# Patient Record
Sex: FEMALE | Race: WHITE
[De-identification: names, ages, dates, MRNs, and addresses within clinical notes are randomized per-mention and may not be internally consistent; named-entity substitution may affect disease eponyms.]

---

## 2020-12-10 ENCOUNTER — HOSPITAL ENCOUNTER (OUTPATIENT)
Dept: HOSPITAL 92 - BICMAMMO | Age: 55
Discharge: HOME | End: 2020-12-10
Attending: OBSTETRICS & GYNECOLOGY
Payer: COMMERCIAL

## 2020-12-10 DIAGNOSIS — R92.1: ICD-10-CM

## 2020-12-10 DIAGNOSIS — N63.10: ICD-10-CM

## 2020-12-10 DIAGNOSIS — R92.8: Primary | ICD-10-CM

## 2020-12-10 PROCEDURE — G0279 TOMOSYNTHESIS, MAMMO: HCPCS

## 2020-12-10 NOTE — ULT
EXAM:

RIGHT BREAST ULTRASOUND:

12/10/20

 

HISTORY: 

Patient presents with a palpable finding in the 3 o'clock position of the right breast inner subareol
ar region. 

 

At 3 o'clock, 1 cm from the nipple there is a solid irregularly hypoechoic poorly defined mass with s
ome associated architectural distortion. The main portion of this mass measures 1.2 x 1.6 x 1.3 cm  i
n size, although there appear to be some findings of abnormal hypoechoic density extending from this 
mass up to 1.1 cm peripheral to the mass worrisome for additional tumor  as well. The right axilla ap
peared unremarkable. 

 

IMPRESSION: 

Poorly circumscribed solid mass with minimal associated spiculation and at least one finger projectin
g from the mass peripherally. BIRADS 5: Highly Suggestive of Malignancy - Appropriate Action Should B
e Taken 

  Requires biopsy or surgical treatment

 

Ultrasound guided biopsy is recommended for further assessment. 

 

The findings were discussed with the patient who understands and is in agreement. Additionally, findi
ngs were discussed with Dr. Grier's nurse, Sadaf who indicated that they would schedule the biopsy fo
r next week. 

 

POS: OFF

## 2020-12-10 NOTE — MMO
Right Breast MAMMO Unilat Diag DDI RT+ROBERTA.

 

CLINICAL HISTORY:

Patient is 55 years old and is seen for diagnostic exam and lump or thickening

in the right breast at 2 o'clock. The patient has no family history of breast

cancer.  The patient has no personal history of cancer. The patient has a

history of right Ultrasound Guided Core Biopsy in December, 2010 - benign.

 

VIEWS:

The views performed were:  right craniocaudal with tomosynthesis; right

mediolateral oblique with tomosynthesis; and right mediolateral with

tomosynthesis.

 

FILMS COMPARED:

The present examination has been compared to prior imaging studies performed at

[facility] on 03/05/2020, at [facility] on 10/05/2010 and

12/10/2020, and at [facility] on 03/22/2005.

 

This study has been interpreted with the assistance of computer-aided detection.

 

MAMMOGRAM FINDINGS:

The breast is extremely dense, which may lower the sensitivity of mammography.

 

Finding 1:  There are stable benign appearing calcifications seen in the right

breast.

 

Finding 2:  There is a new mass measuring 13 x 16 x 17 mm with indistinct

margins seen in the anterior region of the right breast at 3 o'clock.

 

Solid

 

Findings and recommendations were discussed with the patient prior to leaving

the facility. All questions answered.

 

IMPRESSION:

FINDING 1:  STABLE CALCIFICATIONS IN THE RIGHT BREAST ARE BENIGN.

 

FINDING 2:  NEW MASS IN THE RIGHT BREAST IS HIGHLY SUGGESTIVE OF MALIGNANCY.

BIOPSY IS RECOMMENDED.

 

THE RESULTS OF THIS EXAM WERE SENT TO THE PATIENT.

 

ACR BI-RADS Category 5 - Highly suggestive of malignancy - appropriate action

should be taken

 

MAMMOGRAPHY NOTE:

 1. A negative mammogram report should not delay a biopsy if a dominant of

 clinically suspicious mass is present.

 2. Approximately 10% to 15% of breast cancers are not detected by

 mammography.

 3. Adenosis and dense breasts may obscure an underlying neoplasm.

 

 

Reported by: JACINTA SHARMA MD

Electonically Signed: [id number]

## 2020-12-14 ENCOUNTER — HOSPITAL ENCOUNTER (OUTPATIENT)
Dept: HOSPITAL 92 - BICULT | Age: 55
End: 2020-12-14
Attending: OBSTETRICS & GYNECOLOGY
Payer: COMMERCIAL

## 2020-12-14 DIAGNOSIS — Z17.1: ICD-10-CM

## 2020-12-14 DIAGNOSIS — C50.811: Primary | ICD-10-CM

## 2020-12-14 PROCEDURE — 19083 BX BREAST 1ST LESION US IMAG: CPT

## 2020-12-14 PROCEDURE — 88305 TISSUE EXAM BY PATHOLOGIST: CPT

## 2020-12-14 PROCEDURE — 0HBT3ZX EXCISION OF RIGHT BREAST, PERCUTANEOUS APPROACH, DIAGNOSTIC: ICD-10-PCS

## 2020-12-14 NOTE — MMO
FILMS COMPARED:

The present examination has been compared to prior imaging studies performed at

[facility] on 03/05/2020, and at [facility] on 10/05/2010

and 12/10/2020.

 

MAMMOGRAM FINDINGS:

The breast is extremely dense, which may lower the sensitivity of mammography.

 

There is a new biopsy clip seen in the right breast at 3 o'clock.

 

IMPRESSION:

NEW BIOPSY CLIP IN THE RIGHT BREAST IS CONFIRMED UTILIZING POST PROCEDURE

MAMMOGRAM.

 

 

Reported by: INDIGO SONI MD

Electonically Signed: [id number]

## 2020-12-14 NOTE — ULT
US Breast Bx US Guided



History: Breast mass



Comparison: None.



Findings: Patient was brought to the ultrasound suite. All questions were answered. Informed consent 
was obtained. Timeout performed.



Patient's right breast was prepped and draped in normal sterile fashion. After adequate local anesthe
antonina with lidocaine the right breast mass at 3:00 was accessed. A total of 4 14-gauge cores were

obtained. Patient tolerated the procedure well without complication.



Impression: Technically successful ultrasound-guided right 3:00 breast mass biopsy.



Reported By: Dedrick Mendoza 

Electronically Signed:  12/14/2020 2:43 PM

## 2021-05-19 ENCOUNTER — HOSPITAL ENCOUNTER (OUTPATIENT)
Dept: HOSPITAL 92 - ULT | Age: 56
Discharge: HOME | End: 2021-05-19
Attending: INTERNAL MEDICINE
Payer: COMMERCIAL

## 2021-05-19 DIAGNOSIS — Z79.899: ICD-10-CM

## 2021-05-19 DIAGNOSIS — I07.1: ICD-10-CM

## 2021-05-19 DIAGNOSIS — Z51.11: Primary | ICD-10-CM

## 2021-05-19 DIAGNOSIS — C50.212: ICD-10-CM

## 2021-05-19 PROCEDURE — 93306 TTE W/DOPPLER COMPLETE: CPT

## 2021-08-25 ENCOUNTER — HOSPITAL ENCOUNTER (OUTPATIENT)
Dept: HOSPITAL 92 - ULT | Age: 56
Discharge: HOME | End: 2021-08-25
Attending: INTERNAL MEDICINE
Payer: COMMERCIAL

## 2021-08-25 DIAGNOSIS — C50.212: ICD-10-CM

## 2021-08-25 DIAGNOSIS — Z51.11: Primary | ICD-10-CM

## 2021-08-25 DIAGNOSIS — Z79.899: ICD-10-CM

## 2021-08-25 DIAGNOSIS — I08.1: ICD-10-CM

## 2021-08-25 PROCEDURE — 93306 TTE W/DOPPLER COMPLETE: CPT

## 2021-11-19 ENCOUNTER — HOSPITAL ENCOUNTER (OUTPATIENT)
Dept: HOSPITAL 92 - ULT | Age: 56
Discharge: HOME | End: 2021-11-19
Attending: INTERNAL MEDICINE
Payer: COMMERCIAL

## 2021-11-19 DIAGNOSIS — Z51.11: Primary | ICD-10-CM

## 2021-11-19 DIAGNOSIS — C50.212: ICD-10-CM

## 2021-11-19 PROCEDURE — 93306 TTE W/DOPPLER COMPLETE: CPT

## 2022-04-07 ENCOUNTER — HOSPITAL ENCOUNTER (OUTPATIENT)
Dept: HOSPITAL 92 - BICMAMMO | Age: 57
Discharge: HOME | End: 2022-04-07
Attending: INTERNAL MEDICINE
Payer: COMMERCIAL

## 2022-04-07 DIAGNOSIS — Z85.3: ICD-10-CM

## 2022-04-07 DIAGNOSIS — Z08: Primary | ICD-10-CM

## 2022-04-07 PROCEDURE — G0279 TOMOSYNTHESIS, MAMMO: HCPCS

## 2022-07-18 ENCOUNTER — HOSPITAL ENCOUNTER (EMERGENCY)
Dept: HOSPITAL 92 - CSHERS | Age: 57
Discharge: HOME | End: 2022-07-18
Payer: COMMERCIAL

## 2022-07-18 DIAGNOSIS — R55: Primary | ICD-10-CM

## 2022-07-18 LAB
ALBUMIN SERPL BCG-MCNC: 4.5 G/DL (ref 3.5–5)
ALP SERPL-CCNC: 95 U/L (ref 40–110)
ALT SERPL W P-5'-P-CCNC: 19 U/L (ref 8–55)
ANION GAP SERPL CALC-SCNC: 16 MMOL/L (ref 10–20)
AST SERPL-CCNC: 23 U/L (ref 5–34)
BACTERIA UR QL AUTO: (no result) HPF
BASOPHILS # BLD AUTO: 0 10X3/UL (ref 0–0.2)
BASOPHILS NFR BLD AUTO: 0.3 % (ref 0–2)
BILIRUB SERPL-MCNC: 0.3 MG/DL (ref 0.2–1.2)
BUN SERPL-MCNC: 18 MG/DL (ref 9.8–20.1)
CALCIUM SERPL-MCNC: 9.3 MG/DL (ref 7.8–10.44)
CHLORIDE SERPL-SCNC: 102 MMOL/L (ref 98–107)
CK SERPL-CCNC: 113 U/L (ref 29–168)
CO2 SERPL-SCNC: 26 MMOL/L (ref 22–29)
CREAT CL PREDICTED SERPL C-G-VRATE: 0 ML/MIN (ref 70–130)
EOSINOPHIL # BLD AUTO: 0.1 10X3/UL (ref 0–0.5)
EOSINOPHIL NFR BLD AUTO: 1.1 % (ref 0–6)
GLOBULIN SER CALC-MCNC: 2.6 G/DL (ref 2.4–3.5)
GLUCOSE SERPL-MCNC: 98 MG/DL (ref 70–105)
HGB BLD-MCNC: 11.9 G/DL (ref 12–15.5)
LEUKOCYTE ESTERASE UR QL STRIP.AUTO: 100
LYMPHOCYTES NFR BLD AUTO: 30.1 % (ref 18–47)
MCH RBC QN AUTO: 26.8 PG (ref 27–33)
MCV RBC AUTO: 82.2 FL (ref 81.6–98.3)
MONOCYTES # BLD AUTO: 0.7 10X3/UL (ref 0–1.1)
MONOCYTES NFR BLD AUTO: 7.3 % (ref 0–10)
MUCOUS THREADS UR QL AUTO: (no result) LPF
NEUTROPHILS # BLD AUTO: 5.5 10X3/UL (ref 1.5–8.4)
NEUTROPHILS NFR BLD AUTO: 60.9 % (ref 40–75)
PLATELET # BLD AUTO: 286 10X3/UL (ref 150–450)
POTASSIUM SERPL-SCNC: 3.6 MMOL/L (ref 3.5–5.1)
PROT UR STRIP.AUTO-MCNC: 15 MG/DL
RBC # BLD AUTO: 4.44 10X6/UL (ref 3.9–5.03)
RBC UR QL AUTO: (no result) HPF (ref 0–3)
SODIUM SERPL-SCNC: 140 MMOL/L (ref 136–145)
SP GR UR STRIP: 1.02 (ref 1–1.04)
WBC # BLD AUTO: 9 10X3/UL (ref 3.5–10.5)
WBC UR QL AUTO: (no result) HPF (ref 0–3)

## 2022-07-18 PROCEDURE — 71045 X-RAY EXAM CHEST 1 VIEW: CPT

## 2022-07-18 PROCEDURE — 80053 COMPREHEN METABOLIC PANEL: CPT

## 2022-07-18 PROCEDURE — 85379 FIBRIN DEGRADATION QUANT: CPT

## 2022-07-18 PROCEDURE — 84484 ASSAY OF TROPONIN QUANT: CPT

## 2022-07-18 PROCEDURE — 85025 COMPLETE CBC W/AUTO DIFF WBC: CPT

## 2022-07-18 PROCEDURE — 81003 URINALYSIS AUTO W/O SCOPE: CPT

## 2022-07-18 PROCEDURE — 81015 MICROSCOPIC EXAM OF URINE: CPT

## 2022-07-18 PROCEDURE — 93005 ELECTROCARDIOGRAM TRACING: CPT

## 2022-07-18 PROCEDURE — 82550 ASSAY OF CK (CPK): CPT

## 2023-10-26 ENCOUNTER — HOSPITAL ENCOUNTER (OUTPATIENT)
Dept: HOSPITAL 92 - BICMAMMO | Age: 58
Discharge: HOME | End: 2023-10-26
Attending: INTERNAL MEDICINE
Payer: COMMERCIAL

## 2023-10-26 DIAGNOSIS — C50.011: Primary | ICD-10-CM

## 2023-10-26 PROCEDURE — G0279 TOMOSYNTHESIS, MAMMO: HCPCS
